# Patient Record
Sex: FEMALE | Race: OTHER
[De-identification: names, ages, dates, MRNs, and addresses within clinical notes are randomized per-mention and may not be internally consistent; named-entity substitution may affect disease eponyms.]

---

## 2019-01-10 ENCOUNTER — HOSPITAL ENCOUNTER (EMERGENCY)
Dept: HOSPITAL 80 - FED | Age: 19
Discharge: HOME | End: 2019-01-10
Payer: COMMERCIAL

## 2019-01-10 VITALS — SYSTOLIC BLOOD PRESSURE: 123 MMHG | DIASTOLIC BLOOD PRESSURE: 102 MMHG

## 2019-01-10 DIAGNOSIS — K08.89: Primary | ICD-10-CM

## 2019-01-10 NOTE — EDPHY
H & P


Stated Complaint: wisdom tooth pain after extraction


Source: Patient


Exam Limitations: No limitations





- Personal History


LMP (Females 10-55): 8-14 Days Ago


Current Tetanus Diphtheria and Acellular Pertussis (TDAP): Yes





- Medical/Surgical History


Hx Asthma: No


Hx Chronic Respiratory Disease: No


Hx Diabetes: No


Hx Cardiac Disease: No


Hx Renal Disease: No


Hx Cirrhosis: No


Hx Alcoholism: No


Hx HIV/AIDS: No


Hx Splenectomy or Spleen Trauma: No





- Social History


Smoking Status: Never smoked


Time Seen by Provider: 01/10/19 23:23


HPI/ROS: 


HPI:  This is a 18-year-old female who presents with 





Chief Complaint:  Tooth pain





Location:  Tooth


Quality:  Pain


Duration:  4-5 days


Signs and Symptoms: no fever, no nausea, no vomiting, no diarrhea, no urinary 

symptoms, no chest pain, no shortness of breath, no wheezing, no cough, no sore 

throat, no neck stiffness, no joint pain, no swollen glands, no ear pain, no 

rash, no drooling


Timing:  Acute


Severity:  Mild-to-moderate


Context:  Patient is a student at [employer], originally 

from [place], had all of her 4 wisdom teeth removed in [place] approximately 5 days 

ago.  Patient presents with complaints of tooth pain that is not relieved by 

ibuprofen.  She reports that it hurts to open her mouth and she has not been 

able to eat much food for the last several days.  She has been trying to drink 

liquids.  Patient reports that her left jaw is hurting. 


Modifying Factors:  Ibuprofen with no relief





Comment: 








ROS:   A comprehensive 10 system review of systems is otherwise negative aside 

from elements mentioned in the history of present illness. 





MEDICAL/SURGICAL/SOCIAL HISTORY: 


Medical history:  Generally healthy.  Does not take any regular medications.


Surgical history:  Denies


Social history:  Nonsmoker.  Family history noncontributory.











CONSTITUTIONAL:  Nontoxic-appearing, polite and cooperative teenage white female

, awake and alert, no obvious distress


HEENT: Atraumatic and normocephalic, PERRL, EOMI. no globe entrapment, no 

raccoon eyes.  no Warren signs.Tympanic membranes clear. No tympanic membrane 

rupture.  Nares patent; no septal hematoma. Oropharynx clear, no exudate and 

moist pink mucosa. No malocclusion. no dental trauma.  No signs of dry socket.  

No gingival erythema or fluctuance.  Airway patent.  No lymphadenopathy. 


NECK: supple, no lymphadenopathy, No meningismus.


Cardiovascular: Normal S1/S2, tachycardia, regular rhythm, without murmur rub 

or gallop.


PULMONARY/CHEST:  Symmetrical and nontender. no crepitus. Clear to auscultation 

bilaterally. Good air movement. No accessory muscle usage.


ABDOMEN:  Soft, nondistended, nontender.


EXTREMITIES:  2/2 pulses, no deformities, no clubbing, no cyanosis or edema.


NEUROLOGICAL: no focal neuro deficits.  GCS 15. Speech clear.  


SKIN: Warm and dry, no erythema. no rash.  Good capillary refill.  


 (Joslyn Chavez)


Constitutional: 





 Initial Vital Signs











Temperature (C)  36.8 C   01/10/19 23:20


 


Heart Rate  104 H  01/10/19 23:20


 


Respiratory Rate  20   01/10/19 23:20


 


Blood Pressure  123/102 H  01/10/19 23:20


 


O2 Sat (%)  98   01/10/19 23:20








 











O2 Delivery Mode               Room Air














Allergies/Adverse Reactions: 


 





No Known Allergies Allergy (Unverified 01/10/19 23:19)


 








Home Medications: 














 Medication  Instructions  Recorded


 


Amoxicillin Trihydrate [Amoxil] 500 mg PO TID 7 Days  cap 01/10/19


 


Chateal-28 Tablet  01/10/19














Medical Decision Making


ED Course/Re-evaluation: 


Vital signs reviewed and show mild tachycardia.


Patient has no signs of facial cellulitis/dental abscess/malocclusion/dry socket


Given prepack and prescription for amoxicillin and Percocet





This patient was seen under the supervision of my secondary supervising 

physician.  I evaluated care for this patient independently.  


 (Joslyn Chavez)





PHYSICIAN DOCUMENTATION:


The patient was evaluated and managed by the Physician Assistant.  My co-

signature indicates that I have reviewed this chart and I agree with the 

findings and plan of care as documented.  I am the secondary supervising 

physician.


 (Vi Ricketts)


Differential Diagnosis: 


Differential diagnosis includes but is not limited toothache, cellulitis, 

abscess, dry socket.


 (Joslyn Chavez)





- Data Points


Medications Given: 





 








Discontinued Medications





Amoxicillin (Amoxil Chewable 250 Mg Prepack#4)  1 btl TAKEHOME EDNOW ONE


   PRN Reason: Protocol


   Stop: 01/10/19 23:31


   Last Admin: 01/10/19 23:50 Dose:  1 btl


Oxycodone/Acetaminophen (Percocet 5/325mg Prepack#4)  1 btl TAKEWENDYE EDNOW ONE


   Stop: 01/10/19 23:31


   Last Admin: 01/10/19 23:50 Dose:  1 btl








Departure





- Departure


Disposition: Home, Routine, Self-Care


Clinical Impression: 


 Status post wisdom tooth extraction, Odontalgia





Condition: Good


Instructions:  Oxycodone/Acetaminophen (By mouth), Amoxicillin (By mouth), 

Toothache (ED)


Additional Instructions: 


Eat a soft diet.


Consume a minimum of 8-10 glasses of water or electrolyte fluid replacement 

drinks that include Gatorade, Powerade, Pedialyte. 


Take Tylenol 650 mg every 4 hr and/or ibuprofen 600 mg every 8 hr as needed for 

pain.


Take Percocet every 8 hr as needed for severe/breakthrough pain.


Do not take Tylenol and Percocet at the same time.


Taking antibiotic as directed until complete.  Do not miss any doses. 


Referrals: 


[facility] - 3-4 days, if not improved


Prescriptions: 


Amoxicillin Trihydrate [Amoxil] 500 mg PO TID 7 Days  cap

## 2019-01-14 ENCOUNTER — HOSPITAL ENCOUNTER (EMERGENCY)
Dept: HOSPITAL 80 - FED | Age: 19
Discharge: HOME | End: 2019-01-14
Payer: COMMERCIAL

## 2019-01-14 VITALS — DIASTOLIC BLOOD PRESSURE: 78 MMHG | SYSTOLIC BLOOD PRESSURE: 110 MMHG

## 2019-01-14 DIAGNOSIS — R45.851: ICD-10-CM

## 2019-01-14 DIAGNOSIS — F10.920: Primary | ICD-10-CM

## 2019-01-14 LAB — PLATELET # BLD: 412 10^3/UL (ref 150–400)

## 2019-01-14 PROCEDURE — GZ11ZZZ PSYCHOLOGICAL TESTS, PERSONALITY AND BEHAVIORAL: ICD-10-PCS

## 2019-01-14 PROCEDURE — G0480 DRUG TEST DEF 1-7 CLASSES: HCPCS
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
## 2019-01-14 NOTE — ASMTTCLDSP
TLC Discharge Disposition

 

Disposition:                  Answers:  Discharge                             

If                            Answers:  Yes                                   

DISCHARGED: Patient/family                                                    

 given suicide hotline                                                        

info & SAMHSA brochure?                                                       

Disposition Notes:            

Notes:

Py was discharged in care of roommate and friend. Pt will call and make an appt with her therapist 

at [facility] today.

Discharge                     

Concerns/Recommendations:     

Notes:

In consultation with [facility] ED physician Vesna Davidson MD,  concurred that pt does not appear to meet 

27-65 criteria requiring psychiatric hospitalization as pt does not appear to be an imminent risk 

of harm to self/others/gravely disabled due to a mental illness condition. 

Date and time M1 hold         01/14/2019 12:38 PM

vacated (time format is       

hh:mm):                       

 

Date Signed:  01/14/2019 01:50 PM

Electronically Signed By:Moraima Ford

## 2019-01-14 NOTE — EDPHY
H & P


Stated Complaint: AMS


Source: Patient, EMS


Exam Limitations: Intoxication





- Medical/Surgical History


Hx Asthma: No


Hx Chronic Respiratory Disease: No


Hx Diabetes: No


Hx Cardiac Disease: No


Hx Renal Disease: No


Hx Cirrhosis: No


Hx Alcoholism: No


Hx HIV/AIDS: No


Hx Splenectomy or Spleen Trauma: No





- Social History


Smoking Status: Never smoked


Time Seen by Provider: 01/14/19 02:39


HPI/ROS: 





HPI


The patient presents with overdose of alcohol, brought in by ambulance after 

her roommate called 911 because of altered mental status.  The patient had a 

prescription of Percocet with her and paramedics gave her several doses of 

Narcan with no improvement in her mental status.  The patient says she has had 

about 10 drinks of alcohol throughout the day today.  This caused her to feel 

suicidal and she used a knife to cut her left neck very superficially.  She 

says she has been feeling this way for the last 4 months and was drinking 

alcohol to help her to cope.  She did not think the alcohol would causes 

suicide.  She is a college student who returned a few days ago to campus.  We 

saw her in the ER a few days ago after pain after her wisdom teeth were removed 

in [place].  She has been seen at the [facility] for symptoms of 

depression.  She does not want to take medication for this.  She cut her left 

wrist when she was 14 years old but does not have any other self-harm behaviors.





REVIEW OF SYSTEMS


10 systems were reviewed and negative with the exception of the elements 

mentioned in the history of present illness.





PMHx:  Recent wisdom tooth removal





Soc Hx:  College student, from [place] originally








PHYSICAL


General Appearance:  Tearful, clearly intoxicated


Eyes: Pupils equal and round no pallor or injection


ENT, Mouth: Mucous membranes moist


Respiratory: There are no retractions, lungs are clear to auscultation


Cardiovascular:  Regular rate and rhythm 


Gastrointestinal:  Abdomen is soft and non-tender, no masses, bowel sounds 

normal 


Neurological:  A&O, moves all extremities


Skin:  Warm and dry, no rashes


Musculoskeletal: Neck is supple, superficial abrasions to left neck


Extremities:  symmetrical, full range of motion 


Psychiatric:  Patient is oriented X 3, there is no agitation 


 (Riguzzi,Vi)


Constitutional: 


 Initial Vital Signs











Temperature (C)  36.3 C   01/14/19 02:31


 


Heart Rate  126 H  01/14/19 02:31


 


Respiratory Rate  16   01/14/19 02:31


 


Blood Pressure  131/110 H  01/14/19 02:31


 


O2 Sat (%)  98   01/14/19 02:31








 











O2 Delivery Mode               Room Air














Allergies/Adverse Reactions: 


 





No Known Allergies Allergy (Unverified 01/10/19 23:19)


 








Home Medications: 














 Medication  Instructions  Recorded


 


Amoxicillin Trihydrate [Amoxil] 500 mg PO TID 7 Days  cap 01/10/19


 


Chateal-28 Tablet  01/10/19


 


Acetamin-Codein 300-30 mg/12.5  01/14/19


 


Ibuprofen  01/14/19














Medical Decision Making


ED Course/Re-evaluation: 





7:00 a.m.-I assumed care of this patient at shift change.  She presents with 

suicidal ideation on an M1 hold.  She was intoxicated with alcohol last evening 

and presented with self cutting behavior.  Mental health evaluation pending.


This patient was seen by mental health and felt appropriate for outpatient 

treatment of depression.  She denies suicidal ideation.  I agree with this 

assessment.  Safe and stable for discharge home with outpatient mental health 

follow-up. (Vesna Davidson)


Differential Diagnosis: 





18-year-old female, college student, presents with suicidal ideation, cutting 

her neck very superficially while intoxicated with alcohol.  She is brought in 

by ambulance.  Police have placed her on an M1 hold.  Here, she is common 

cooperative.





Plan for basic labs, observation, mental health evaluation.  His differential 

diagnosis includes alcohol intoxication, polysubstance abuse, suicidal ideation 

related to underlying depression.





The patient was monitored in the emergency department.  Blood alcohol level was 

elevated.  The case will be signed out at 7:00 a.m. To Dr. Davidson.  The patient 

is awaiting mental health evaluation. (Vi Ricketts)





- Data Points


Laboratory Results: 


 Laboratory Results





 01/14/19 02:30 





 01/14/19 02:30 











Departure





- Departure


Disposition: Home, Routine, Self-Care


Clinical Impression: 


 Alcoholic intoxication





Condition: Good


Instructions:  Alcohol Intoxication (ED), Suicide Prevention (ED)


Referrals: 


JACQUIE FRANKS,. [Clinic] - As per Instructions

## 2019-12-18 NOTE — ASMTCMCOM
CM Note

 

CM Note                       

Notes:

This CM attempted to contact patient on her listed cell phone number [phone] to inform her 

of medications in the pharmacy that belong to her. Unfortunately, the phone number listed does not 

go to  and was not answered after 3 attempts.



If patient presents to hospital for her medications she can contact pharmacy directly



CM available for further needs

 

Date Signed:  01/16/2019 11:08 AM

Electronically Signed By:Cuca Arevalo RN
Health Care Proxy (HCP)